# Patient Record
Sex: FEMALE | Race: WHITE | NOT HISPANIC OR LATINO | Employment: OTHER | URBAN - METROPOLITAN AREA
[De-identification: names, ages, dates, MRNs, and addresses within clinical notes are randomized per-mention and may not be internally consistent; named-entity substitution may affect disease eponyms.]

---

## 2017-01-04 ENCOUNTER — OFFICE VISIT (OUTPATIENT)
Dept: FAMILY MEDICINE CLINIC | Facility: CLINIC | Age: 47
End: 2017-01-04

## 2017-01-04 VITALS
BODY MASS INDEX: 27.81 KG/M2 | HEIGHT: 69 IN | DIASTOLIC BLOOD PRESSURE: 76 MMHG | OXYGEN SATURATION: 96 % | WEIGHT: 187.8 LBS | TEMPERATURE: 97.5 F | HEART RATE: 67 BPM | SYSTOLIC BLOOD PRESSURE: 116 MMHG

## 2017-01-04 DIAGNOSIS — G47.09 OTHER INSOMNIA: ICD-10-CM

## 2017-01-04 DIAGNOSIS — F41.9 ANXIETY: Primary | ICD-10-CM

## 2017-01-04 DIAGNOSIS — M54.50 LOW BACK PAIN WITHOUT SCIATICA, UNSPECIFIED BACK PAIN LATERALITY, UNSPECIFIED CHRONICITY: ICD-10-CM

## 2017-01-04 LAB
BILIRUB BLD-MCNC: NEGATIVE MG/DL
CLARITY, POC: CLEAR
COLOR UR: YELLOW
GLUCOSE UR STRIP-MCNC: NEGATIVE MG/DL
KETONES UR QL: NEGATIVE
LEUKOCYTE EST, POC: NEGATIVE
NITRITE UR-MCNC: NEGATIVE MG/ML
PH UR: 7 [PH] (ref 5–8)
PROT UR STRIP-MCNC: NEGATIVE MG/DL
RBC # UR STRIP: NEGATIVE /UL
SP GR UR: 1.02 (ref 1–1.03)
UROBILINOGEN UR QL: NORMAL

## 2017-01-04 PROCEDURE — 99214 OFFICE O/P EST MOD 30 MIN: CPT | Performed by: NURSE PRACTITIONER

## 2017-01-04 PROCEDURE — 81003 URINALYSIS AUTO W/O SCOPE: CPT | Performed by: NURSE PRACTITIONER

## 2017-01-04 RX ORDER — DIAZEPAM 10 MG/1
10 TABLET ORAL 2 TIMES DAILY
Qty: 60 TABLET | Refills: 0 | Status: SHIPPED | OUTPATIENT
Start: 2017-01-04

## 2017-01-04 RX ORDER — ZOLPIDEM TARTRATE 10 MG/1
10 TABLET ORAL NIGHTLY PRN
Qty: 90 TABLET | Refills: 0 | Status: SHIPPED | OUTPATIENT
Start: 2017-01-04 | End: 2017-04-03 | Stop reason: SDUPTHER

## 2017-01-04 RX ORDER — VITAMIN B COMPLEX
CAPSULE ORAL
COMMUNITY
End: 2021-12-03 | Stop reason: SDDI

## 2017-01-04 NOTE — PROGRESS NOTES
Subjective   Norma Thomas is a 46 y.o. female.     History of Present Illness   Norma Thomas 46 y.o. female presents for follow up of insomnia with onset of symptoms years ago. Patient describes symptoms as difficulty falling asleep. Patient has found complete relief with prescription sleep aid, Ambien. Associated symptoms include: fatigue if unable to take Rx . Patient denies daytime somnolence Symptoms have stabilized.  The patient has failed multiple OTC medications for insomnia.  They are well controlled on current Rx and will continue to try to take Rx PRN.  She will use the lowest effective dose.  The patient has read and signed the [facility] Controlled Substance Contract.  I will continue to see patient for regular follow up appointments and be prescribed the lowest effective dose.  REMEDIOS has been reviewed by me and is updated every 3 months. The patient is aware of the potential for addiction and dependence.  She denies that Ambien cause excessive daytime drowsiness and sleep walking.    Anxiety: Patient complains of anxiety disorder.  She has the following symptoms: chest pain, difficulty concentrating, racing thoughts, shortness of breath, sweating. Onset of symptoms was approximately several years ago, stable since that time. She denies current suicidal and homicidal ideation. Family history significant for no psychiatric illness.Possible organic causes contributing are: none. Risk factors: none Previous treatment includes Xanax   She complains of the following side effects from the treatment: none.    Upper Respiratory Infection: Patient complains of symptoms of a URI. Symptoms include left ear pain. Onset of symptoms was 3 days ago, unchanged since that time.   She is drinking plenty of fluids. Evaluation to date: none. Treatment to date: antihistamines.    Having some right side and back pain and wondering if has uti.  No other s/s    The following portions of the patient's history were  "reviewed and updated as appropriate: allergies, current medications, past social history and problem list.    Review of Systems   HENT: Positive for ear pain.    All other systems reviewed and are negative.      Objective   Visit Vitals   • /76 (BP Location: Left arm, Patient Position: Sitting)   • Pulse 67   • Temp 97.5 °F (36.4 °C)   • Ht 69\" (175.3 cm)   • Wt 187 lb 12.8 oz (85.2 kg)   • SpO2 96%   • BMI 27.73 kg/m2     Physical Exam   Constitutional: She is oriented to person, place, and time. Vital signs are normal. She appears well-developed and well-nourished. No distress.   HENT:   Head: Normocephalic.   Cardiovascular: Normal rate, regular rhythm and normal heart sounds.    Pulmonary/Chest: Effort normal and breath sounds normal.   Neurological: She is alert and oriented to person, place, and time. Gait normal.   Psychiatric: She has a normal mood and affect. Her behavior is normal. Judgment and thought content normal.   Vitals reviewed.    The patient has read and signed the [facility] Controlled Substance Contract.  I will continue to see patient for regular follow up appointments.  They are well controlled on their medication.  REMEDIOS has been reviewed by me and is updated every 3 months. The patient is aware of the potential for addiction and dependence.    Assessment/Plan   Problem List Items Addressed This Visit        Nervous and Auditory    Low back pain    Relevant Orders    POC Urinalysis Dipstick, Automated (Completed)       Other    Insomnia    Relevant Medications    zolpidem (AMBIEN) 10 MG tablet    Anxiety - Primary    Relevant Medications    diazePAM (VALIUM) 10 MG tablet           rto in 3 mons   "

## 2017-01-04 NOTE — MR AVS SNAPSHOT
Norma Thomas   1/4/2017 12:40 PM   Office Visit    Dept Phone:  [phone]   Encounter #:  [id number]    Provider:  SHANIA Laguna   Department:  [facility]                Your Full Care Plan              Today's Medication Changes          These changes are accurate as of: 1/4/17  1:54 PM.  If you have any questions, ask your nurse or doctor.               Stop taking medication(s)listed here:     azithromycin 250 MG tablet   Commonly known as:  ZITHROMAX Z-ISAAC   Stopped by:  SHANIA Laguna           DULERA IN   Stopped by:  SHANIA Laguna           guaiFENesin-codeine 100-10 MG/5ML syrup   Commonly known as:  ROMILAR-AC   Stopped by:  SHANIA Laguna           MethylPREDNISolone 4 MG tablet   Commonly known as:  MEDROL (ISAAC)   Stopped by:  SHANIA Laguna           PROAIR RESPICLICK 108 (90 BASE) MCG/ACT inhaler   Generic drug:  albuterol   Stopped by:  SHANIA Laguna           promethazine-dextromethorphan 6.25-15 MG/5ML syrup   Commonly known as:  PROMETHAZINE-DM   Stopped by:  SHANIA Laguna                Where to Get Your Medications      You can get these medications from any pharmacy     Bring a paper prescription for each of these medications     diazePAM 10 MG tablet    zolpidem 10 MG tablet                  Your Updated Medication List          This list is accurate as of: 1/4/17  1:54 PM.  Always use your most recent med list.                b complex vitamins capsule       diazePAM 10 MG tablet   Commonly known as:  VALIUM   Take 1 tablet by mouth 2 (Two) Times a Day.       escitalopram 20 MG tablet   Commonly known as:  LEXAPRO   TAKE ONE TABLET BY MOUTH DAILY       fexofenadine 180 MG tablet   Commonly known as:  ALLEGRA       Ibuprofen-Famotidine 800-26.6 MG tablet   Take 800 mg by mouth 3 (three) times a day. 1 - 2 x per day as needed       metoprolol succinate XL 50 MG 24 hr tablet   Commonly known as:   "TOPROL-XL   TAKE ONE TABLET BY MOUTH DAILY       zolpidem 10 MG tablet   Commonly known as:  AMBIEN   Take 1 tablet by mouth At Night As Needed for sleep.               We Performed the Following     POC Urinalysis Dipstick, Automated       You Were Diagnosed With        Codes Comments    Anxiety    -  Primary ICD-10-CM: F41.9  ICD-9-CM: 300.00     Other insomnia     ICD-10-CM: G47.09  ICD-9-CM: 780.52     Low back pain without sciatica, unspecified back pain laterality, unspecified chronicity     ICD-10-CM: M54.5  ICD-9-CM: 724.2       Medications to be Given to You by a Medical Professional       Instructions     None    Patient Instructions History      Upcoming Appointments     Visit Type Date Time Department    OFF[facility]2017 12[facility]    HOME SLEEP STUDY 1/26/2017  1:00 PM [facility]      MyChart Signup     Our records indicate that you have declined [facility] [employer] signup. If you would like to sign up for [employer], please email [email or web address] or call [phone] to obtain an activation code.             Other Info from Your Visit           Your Appointments     Jan 26, 2017  1:00 PM EST   HOME SLEEP STUDY with  [facility]   [facility] ([place])    [address] [place] [id number] [phone]              Allergies     Codeine      Dust Mite Extract      Latex      Penicillins      Trichophyton      Pseudoephedrine  Palpitations      Reason for Visit     Med Refill Needing Ambien refilled for insomnia and diazepam refilled. Also patient is wanting samples of duexis     Earache Patient is wanting her left ear checked due to some pain       Vital Signs     Blood Pressure Pulse Temperature Height Weight Oxygen Saturation    116/76 (BP Location: Left arm, Patient Position: Sitting) 67 97.5 °F (36.4 °C) 69\" (175.3 cm) 187 lb 12.8 oz (85.2 kg) 96%    Body Mass Index Smoking Status                27.73 " kg/m2 Never Smoker          Problems and Diagnoses Noted     Anxiety problem    Difficulty falling or staying asleep    Low back pain      Results     POC Urinalysis Dipstick, Automated      Component Value Standard Range & Units    Color Yellow Yellow, Straw, Dark Yellow, Renuka    Clarity, UA Clear Clear    Glucose, UA Negative Negative, 1000 mg/dL (3+) mg/dL    Bilirubin Negative Negative    Ketones, UA Negative Negative    Specific Gravity  1.020 1.005 - 1.030    Blood, UA Negative Negative    pH, Urine 7.0 5.0 - 8.0    Protein, POC Negative Negative mg/dL    Urobilinogen, UA Normal Normal    Leukocytes Negative Negative    Nitrite, UA Negative Negative

## 2017-01-12 ENCOUNTER — TELEPHONE (OUTPATIENT)
Dept: FAMILY MEDICINE CLINIC | Facility: CLINIC | Age: 47
End: 2017-01-12

## 2017-01-12 NOTE — TELEPHONE ENCOUNTER
Patient was told to call and let Vini know if she liked the samples of the Vimovo and she said it is working and was requesting a prescription

## 2017-01-26 ENCOUNTER — HOSPITAL ENCOUNTER (OUTPATIENT)
Dept: SLEEP MEDICINE | Facility: HOSPITAL | Age: 47
Discharge: HOME | End: 2017-01-26
Attending: PSYCHIATRY & NEUROLOGY | Admitting: PSYCHIATRY & NEUROLOGY

## 2017-01-26 DIAGNOSIS — G47.10 HYPERSOMNIA: ICD-10-CM

## 2017-01-26 DIAGNOSIS — G47.33 OSA (OBSTRUCTIVE SLEEP APNEA): ICD-10-CM

## 2017-01-26 DIAGNOSIS — I49.9 IRREGULAR HEART BEAT: ICD-10-CM

## 2017-01-26 PROCEDURE — 95806 SLEEP STUDY UNATT&RESP EFFT: CPT

## 2017-02-10 ENCOUNTER — TELEPHONE (OUTPATIENT)
Dept: FAMILY MEDICINE CLINIC | Facility: CLINIC | Age: 47
End: 2017-02-10

## 2017-02-10 RX ORDER — AZITHROMYCIN 250 MG/1
TABLET, FILM COATED ORAL
Qty: 6 TABLET | Refills: 0 | OUTPATIENT
Start: 2017-02-10 | End: 2020-07-17

## 2017-02-22 ENCOUNTER — TELEPHONE (OUTPATIENT)
Dept: FAMILY MEDICINE CLINIC | Facility: CLINIC | Age: 47
End: 2017-02-22

## 2017-02-22 ENCOUNTER — TELEPHONE (OUTPATIENT)
Dept: SLEEP MEDICINE | Facility: HOSPITAL | Age: 47
End: 2017-02-22

## 2017-02-22 NOTE — TELEPHONE ENCOUNTER
LM for pt to call sdc to s/u r/v with dr. Adler.  Hst results of 9.7 events per hour of sleep.    Pt to f/u for tx options

## 2017-02-23 NOTE — TELEPHONE ENCOUNTER
Yelena from the sleep center called and left a message saying [place] and is behind on his dictations when it is read they will send it to our office

## 2017-02-23 NOTE — TELEPHONE ENCOUNTER
So the test is in there but the results are not...1970 they not send over a result?  Can you find out?

## 2017-03-16 ENCOUNTER — TELEPHONE (OUTPATIENT)
Dept: SLEEP MEDICINE | Facility: HOSPITAL | Age: 47
End: 2017-03-16

## 2017-03-21 ENCOUNTER — HOSPITAL ENCOUNTER (OUTPATIENT)
Dept: SLEEP MEDICINE | Facility: HOSPITAL | Age: 47
Discharge: HOME | End: 2017-03-21

## 2017-03-22 NOTE — PROGRESS NOTES
DATE OF SERVICE: 03/21/2017    HISTORY OF PRESENT ILLNESS: The patient comes in for followup visit. Her home sleep study performed on 01/27/2017 revealed mild obstructive sleep apnea syndrome with apnea-hypopnea index AHI 9.7 per sleep hour, during supine position apnea-hypopnea index of 14.9, minimum SpO2 of 85%. She has severe hypersomnia with [place] Sleepiness Scale score of 17. Her snoring was noted for 26.5% of sleep time. Because of severe hypersomnia, mild obstructive sleep apnea, and now hypoxia, I plan to treat her with auto CPAP therapy.     PHYSICAL EXAMINATION:  VITAL SIGNS: Weight 205 pounds, height 69 inches, body mass index 38, blood pressure 108/60, heart rate 69.   HEENT: Normal.   NECK: Supple. No bruits.   CARDIAC: Normal.   LUNGS: Clear to auscultation.   EXTREMITIES: No edema.     IMPRESSION: Patient with obstructive sleep apnea syndrome with severe hypersomnia and irregular heartbeats, and will treat with auto CPAP therapy.     RECOMMENDATIONS: Auto CPAP 5-15 cm of water and follow up in 2 months. Exercise, diet, and weight loss discussed.        ARIADNA Costa:carlos  D:   03/21/2017 12:33:14  T:   03/22/2017 03:55:32  Job ID:   [id number]  Document ID:   [id number]  cc:

## 2017-03-24 ENCOUNTER — TELEPHONE (OUTPATIENT)
Dept: SLEEP MEDICINE | Facility: HOSPITAL | Age: 47
End: 2017-03-24

## 2017-04-03 DIAGNOSIS — G47.09 OTHER INSOMNIA: ICD-10-CM

## 2017-04-03 RX ORDER — ZOLPIDEM TARTRATE 10 MG/1
TABLET ORAL
Qty: 90 TABLET | Refills: 0 | Status: CANCELLED | OUTPATIENT
Start: 2017-04-03

## 2017-04-03 RX ORDER — ZOLPIDEM TARTRATE 10 MG/1
10 TABLET ORAL NIGHTLY PRN
Qty: 90 TABLET | Refills: 0 | OUTPATIENT
Start: 2017-04-03 | End: 2017-04-03 | Stop reason: SDUPTHER

## 2017-04-03 RX ORDER — ZOLPIDEM TARTRATE 10 MG/1
10 TABLET ORAL NIGHTLY PRN
Qty: 30 TABLET | Refills: 0 | OUTPATIENT
Start: 2017-04-03

## 2017-05-01 RX ORDER — ESCITALOPRAM OXALATE 20 MG/1
TABLET ORAL
Qty: 30 TABLET | Refills: 6 | Status: SHIPPED | OUTPATIENT
Start: 2017-05-01

## 2017-07-25 ENCOUNTER — APPOINTMENT (OUTPATIENT)
Dept: SLEEP MEDICINE | Facility: HOSPITAL | Age: 47
End: 2017-07-25

## 2017-08-08 ENCOUNTER — HOSPITAL ENCOUNTER (OUTPATIENT)
Dept: SLEEP MEDICINE | Facility: HOSPITAL | Age: 47
Discharge: HOME | End: 2017-08-08
Admitting: PSYCHIATRY & NEUROLOGY

## 2017-08-08 PROCEDURE — G0463 HOSPITAL OUTPT CLINIC VISIT: HCPCS
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
## 2017-09-07 ENCOUNTER — APPOINTMENT (OUTPATIENT)
Dept: WOMENS IMAGING | Facility: HOSPITAL | Age: 47
End: 2017-09-07

## 2017-09-07 PROCEDURE — 77063 BREAST TOMOSYNTHESIS BI: CPT | Performed by: RADIOLOGY

## 2017-09-07 PROCEDURE — 77067 SCR MAMMO BI INCL CAD: CPT | Performed by: RADIOLOGY

## 2017-09-07 PROCEDURE — G0202 SCR MAMMO BI INCL CAD: HCPCS | Performed by: RADIOLOGY

## 2017-10-05 ENCOUNTER — APPOINTMENT (OUTPATIENT)
Dept: WOMENS IMAGING | Facility: HOSPITAL | Age: 47
End: 2017-10-05

## 2017-10-05 PROCEDURE — 76641 ULTRASOUND BREAST COMPLETE: CPT | Performed by: RADIOLOGY

## 2017-10-05 PROCEDURE — G0279 TOMOSYNTHESIS, MAMMO: HCPCS | Performed by: RADIOLOGY

## 2017-10-05 PROCEDURE — 77061 BREAST TOMOSYNTHESIS UNI: CPT | Performed by: RADIOLOGY

## 2017-10-05 PROCEDURE — G0206 DX MAMMO INCL CAD UNI: HCPCS | Performed by: RADIOLOGY

## 2017-10-05 PROCEDURE — 77065 DX MAMMO INCL CAD UNI: CPT | Performed by: RADIOLOGY

## 2017-10-10 ENCOUNTER — APPOINTMENT (OUTPATIENT)
Dept: SLEEP MEDICINE | Facility: HOSPITAL | Age: 47
End: 2017-10-10

## 2017-10-13 ENCOUNTER — APPOINTMENT (OUTPATIENT)
Dept: WOMENS IMAGING | Facility: HOSPITAL | Age: 47
End: 2017-10-13

## 2017-10-13 PROCEDURE — 19084 BX BREAST ADD LESION US IMAG: CPT | Performed by: RADIOLOGY

## 2017-10-13 PROCEDURE — 19083 BX BREAST 1ST LESION US IMAG: CPT | Performed by: RADIOLOGY

## 2017-10-13 PROCEDURE — 19000 PUNCTURE ASPIR CYST BREAST: CPT | Performed by: RADIOLOGY

## 2017-10-13 PROCEDURE — 76942 ECHO GUIDE FOR BIOPSY: CPT | Performed by: RADIOLOGY

## 2018-04-25 ENCOUNTER — APPOINTMENT (OUTPATIENT)
Dept: WOMENS IMAGING | Facility: HOSPITAL | Age: 48
End: 2018-04-25

## 2018-04-25 PROCEDURE — 76641 ULTRASOUND BREAST COMPLETE: CPT | Performed by: RADIOLOGY

## 2018-05-08 ENCOUNTER — APPOINTMENT (OUTPATIENT)
Dept: WOMENS IMAGING | Facility: HOSPITAL | Age: 48
End: 2018-05-08

## 2018-05-08 PROCEDURE — 19083 BX BREAST 1ST LESION US IMAG: CPT | Performed by: RADIOLOGY

## 2019-09-09 ENCOUNTER — TRANSCRIBE ORDERS (OUTPATIENT)
Dept: ADMINISTRATIVE | Facility: HOSPITAL | Age: 49
End: 2019-09-09

## 2019-09-09 DIAGNOSIS — Z13.820 SCREENING FOR OSTEOPOROSIS: Primary | ICD-10-CM

## 2021-10-18 ENCOUNTER — APPOINTMENT (OUTPATIENT)
Dept: LAB | Facility: HOSPITAL | Age: 51
End: 2021-10-18

## 2021-10-28 ENCOUNTER — TELEPHONE (OUTPATIENT)
Dept: ONCOLOGY | Facility: CLINIC | Age: 51
End: 2021-10-28

## 2021-10-28 ENCOUNTER — APPOINTMENT (OUTPATIENT)
Dept: LAB | Facility: HOSPITAL | Age: 51
End: 2021-10-28

## 2021-11-01 ENCOUNTER — TELEPHONE (OUTPATIENT)
Dept: ONCOLOGY | Facility: OTHER | Age: 51
End: 2021-11-01

## 2021-11-01 ENCOUNTER — APPOINTMENT (OUTPATIENT)
Dept: LAB | Facility: HOSPITAL | Age: 51
End: 2021-11-01

## 2021-12-03 ENCOUNTER — CONSULT (OUTPATIENT)
Dept: ONCOLOGY | Facility: CLINIC | Age: 51
End: 2021-12-03

## 2021-12-03 ENCOUNTER — LAB (OUTPATIENT)
Dept: LAB | Facility: HOSPITAL | Age: 51
End: 2021-12-03

## 2021-12-03 VITALS
HEART RATE: 75 BPM | HEIGHT: 68 IN | OXYGEN SATURATION: 96 % | DIASTOLIC BLOOD PRESSURE: 77 MMHG | SYSTOLIC BLOOD PRESSURE: 114 MMHG | BODY MASS INDEX: 30.71 KG/M2 | RESPIRATION RATE: 16 BRPM | TEMPERATURE: 97.3 F | WEIGHT: 202.6 LBS

## 2021-12-03 DIAGNOSIS — R79.89 ABNORMAL CBC: Primary | ICD-10-CM

## 2021-12-03 DIAGNOSIS — R53.82 CHRONIC FATIGUE: ICD-10-CM

## 2021-12-03 DIAGNOSIS — M25.50 ARTHRALGIA OF MULTIPLE JOINTS: Primary | ICD-10-CM

## 2021-12-03 LAB
BASOPHILS # BLD AUTO: 0.04 10*3/MM3 (ref 0–0.2)
BASOPHILS NFR BLD AUTO: 0.5 % (ref 0–1.5)
DEPRECATED RDW RBC AUTO: 40.6 FL (ref 37–54)
EOSINOPHIL # BLD AUTO: 0.2 10*3/MM3 (ref 0–0.4)
EOSINOPHIL NFR BLD AUTO: 2.6 % (ref 0.3–6.2)
ERYTHROCYTE [DISTWIDTH] IN BLOOD BY AUTOMATED COUNT: 11.9 % (ref 12.3–15.4)
HCT VFR BLD AUTO: 41.6 % (ref 34–46.6)
HGB BLD-MCNC: 13.3 G/DL (ref 12–15.9)
IMM GRANULOCYTES # BLD AUTO: 0.03 10*3/MM3 (ref 0–0.05)
IMM GRANULOCYTES NFR BLD AUTO: 0.4 % (ref 0–0.5)
LYMPHOCYTES # BLD AUTO: 1.47 10*3/MM3 (ref 0.7–3.1)
LYMPHOCYTES NFR BLD AUTO: 19.4 % (ref 19.6–45.3)
MCH RBC QN AUTO: 29.6 PG (ref 26.6–33)
MCHC RBC AUTO-ENTMCNC: 32 G/DL (ref 31.5–35.7)
MCV RBC AUTO: 92.7 FL (ref 79–97)
MONOCYTES # BLD AUTO: 0.87 10*3/MM3 (ref 0.1–0.9)
MONOCYTES NFR BLD AUTO: 11.5 % (ref 5–12)
NEUTROPHILS NFR BLD AUTO: 4.97 10*3/MM3 (ref 1.7–7)
NEUTROPHILS NFR BLD AUTO: 65.6 % (ref 42.7–76)
NRBC BLD AUTO-RTO: 0 /100 WBC (ref 0–0.2)
PLATELET # BLD AUTO: 250 10*3/MM3 (ref 140–450)
PMV BLD AUTO: 10.2 FL (ref 6–12)
RBC # BLD AUTO: 4.49 10*6/MM3 (ref 3.77–5.28)
WBC NRBC COR # BLD: 7.58 10*3/MM3 (ref 3.4–10.8)

## 2021-12-03 PROCEDURE — 85025 COMPLETE CBC W/AUTO DIFF WBC: CPT

## 2021-12-03 PROCEDURE — 36415 COLL VENOUS BLD VENIPUNCTURE: CPT

## 2021-12-03 PROCEDURE — 99204 OFFICE O/P NEW MOD 45 MIN: CPT | Performed by: INTERNAL MEDICINE

## 2021-12-03 RX ORDER — DIPHENOXYLATE HYDROCHLORIDE AND ATROPINE SULFATE 2.5; .025 MG/1; MG/1
TABLET ORAL DAILY
COMMUNITY

## 2021-12-03 RX ORDER — TRAMADOL HYDROCHLORIDE 50 MG/1
TABLET ORAL
COMMUNITY
Start: 2021-12-01

## 2021-12-04 LAB
ALBUMIN SERPL-MCNC: 4.5 G/DL (ref 3.8–4.9)
ALBUMIN/GLOB SERPL: 2.1 {RATIO} (ref 1.2–2.2)
ALP SERPL-CCNC: 92 IU/L (ref 44–121)
ALT SERPL-CCNC: 15 IU/L (ref 0–32)
AST SERPL-CCNC: 19 IU/L (ref 0–40)
BILIRUB SERPL-MCNC: 0.3 MG/DL (ref 0–1.2)
BUN SERPL-MCNC: 17 MG/DL (ref 6–24)
BUN/CREAT SERPL: 22 (ref 9–23)
CALCIUM SERPL-MCNC: 9.8 MG/DL (ref 8.7–10.2)
CHLORIDE SERPL-SCNC: 105 MMOL/L (ref 96–106)
CO2 SERPL-SCNC: 22 MMOL/L (ref 20–29)
CREAT SERPL-MCNC: 0.79 MG/DL (ref 0.57–1)
CRP SERPL-MCNC: 6 MG/L (ref 0–10)
ERYTHROCYTE [SEDIMENTATION RATE] IN BLOOD BY WESTERGREN METHOD: 3 MM/HR (ref 0–40)
GLOBULIN SER CALC-MCNC: 2.1 G/DL (ref 1.5–4.5)
GLUCOSE SERPL-MCNC: 99 MG/DL (ref 65–99)
POTASSIUM SERPL-SCNC: 4.6 MMOL/L (ref 3.5–5.2)
PROT SERPL-MCNC: 6.6 G/DL (ref 6–8.5)
RHEUMATOID FACT SERPL-ACNC: <10 IU/ML
SODIUM SERPL-SCNC: 146 MMOL/L (ref 134–144)

## 2021-12-04 NOTE — PROGRESS NOTES
Chief Complaint  Abnormal von Willebrand's labs    Subjective    The patient was referred by Dr. Pratik Myles for hematology consultation regarding the above issue.    History of Present Illness  The patient is a 51-year-old female here for hematology consultation regarding abnormal von Willebrand's labs.    The patient has prior history of hypertension, GERD, depression/anxiety, chronic fatigue, fibromyalgia, obstructive sleep apnea, previous menorrhagia (status post endometrial ablation with subsequent menopause), endometriosis, mitral valve prolapse, chronic back pain, previous benign breast biopsies.    The patient denies any family history of bleeding disorder. In fact there is history of thrombosis with her mother having had DVT and pulmonary embolism around age 70 in the setting of lupus and is on chronic anticoagulation. She believes that her father has had a DVT at age 87, maternal aunt with DVT, maternal grandmother with a CVA.    The patient has never experienced any significant bleeding difficulties with the exception of menorrhagia. She underwent endometrial ablation around age 40 with normalization of menstrual cycles until she became menopausal around age 50. She has experienced previous tooth extractions without significant bleeding difficulties.    The patient reports that she has had significant difficulty with chronic fatigue for quite some time. She also has chronic arthralgias. She takes diclofenac 75 mg twice per day and has done this for over a year. She developed increased bruising involving her forearms in early 2021. She had laboratory evaluation 2/26/2021 with Dr. Myles with factor VIII activity 177%, von Willebrand factor antigen 223%, von Willebrand factor activity 173%, INR 1.0, PTT 28, platelet count 314,000.    Patient reports that she has had a positive DOMENICO in the past. She was referred to both hematology due to her abnormal von Willebrand laboratory values and was also referred to  "rheumatology however rheumatology consultation has been placed on hold pending hematology evaluation.      Objective   Vital Signs:   /77   Pulse 75   Temp 97.3 °F (36.3 °C) (Temporal)   Resp 16   Ht 173 cm (68.11\") Comment: new ht  Wt 91.9 kg (202 lb 9.6 oz)   SpO2 96%   BMI 30.71 kg/m²     Physical Exam  Constitutional:       Appearance: She is well-developed.   Eyes:      Conjunctiva/sclera: Conjunctivae normal.   Neck:      Thyroid: No thyromegaly.   Cardiovascular:      Rate and Rhythm: Normal rate and regular rhythm.      Heart sounds: No murmur heard.  No friction rub. No gallop.    Pulmonary:      Effort: No respiratory distress.      Breath sounds: Normal breath sounds.   Chest:   Breasts:      Right: No supraclavicular adenopathy.      Left: No supraclavicular adenopathy.       Abdominal:      General: Bowel sounds are normal. There is no distension.      Palpations: Abdomen is soft.      Tenderness: There is no abdominal tenderness.   Lymphadenopathy:      Head:      Right side of head: No submandibular adenopathy.      Cervical: No cervical adenopathy.      Upper Body:      Right upper body: No supraclavicular adenopathy.      Left upper body: No supraclavicular adenopathy.   Skin:     General: Skin is warm and dry.      Findings: No rash.   Neurological:      Mental Status: She is alert and oriented to person, place, and time.      Cranial Nerves: No cranial nerve deficit.      Motor: No abnormal muscle tone.      Deep Tendon Reflexes: Reflexes normal.   Psychiatric:         Behavior: Behavior normal.        Result Review : Reviewed CBC from today. Reviewed laboratory studies from PCP including factor VIII activity, von Willebrand factor antigen and activity, PT, PTT, CBC. Reviewed PCP progress note.       Assessment and Plan     1. Easy bruising  · The patient denies any family history of bleeding disorder. In fact there is history of thrombosis with her mother having had DVT and pulmonary " embolism around age 70 in the setting of lupus and is on chronic anticoagulation. She believes that her father had a DVT at age 87, maternal aunt with DVT, maternal grandmother with a CVA.  · The patient has never experienced any significant bleeding difficulties with the exception of menorrhagia. She underwent endometrial ablation around age 40 with normalization of menstrual cycles until she became menopausal around age 50. She has experienced previous tooth extractions without significant bleeding difficulties.  · The patient reports that she has had significant difficulty with chronic fatigue for quite some time. She also has chronic arthralgias. She takes diclofenac 75 mg twice per day and has done this for over a year.   · She developed increased bruising involving her forearms in early 2021.   · She had laboratory evaluation 2/26/2021 with Dr. Myles with factor VIII activity 177%, von Willebrand factor antigen 223%, von Willebrand factor activity 173%, INR 1.0, PTT 28, platelet count 314,000.  · Patient is here today for initial hematology evaluation. She has had some minor difficulty with easy bruising on her forearms. She has not experienced any more significant bleeding issues. She does take diclofenac scheduled 75 mg twice daily. This appears to be the likely cause of her easy bruising. She reports that the bruising has improved recently. We reviewed her previous labs from 2/26/2021. We discussed that concern regarding von Willebrand's disease would be based on low von Willebrand factor activity/antigen and not based on elevated levels as she exhibited. There is nothing to suggest von Willebrand's disease in this situation. I would not recommend any further evaluation for this issue.  2. Chronic fatigue and arthralgias in the setting of fibromyalgia  · Most likely the patient's symptoms are related to her underlying fibromyalgia  · I did recommend that she follow through with rheumatology  consultation  · We will check additional labs today with ESR, CRP, DOMENICO panel, rheumatoid factor, anti-CCP antibodies and we will notify her of any abnormal results.    Plan:  1. There are no laboratory or clinical findings to suggest von Willebrand's disease, no further evaluation is recommended  2. Additional labs with ESR, CRP, DOMENICO panel, rheumatoid factor, anti-CCP antibodies today. Will notify patient of any abnormal results. Patient was encouraged to follow through with rheumatology consultation.  3. Patient does not require any further hematologic follow-up at this time.        I spent 45 minutes caring for Norma on this date of service. This time includes time spent by me in the following activities:preparing for the visit, reviewing tests, performing a medically appropriate examination and/or evaluation , counseling and educating the patient/family/caregiver, ordering medications, tests, or procedures and documenting information in the medical record

## 2021-12-05 LAB
CENTROMERE B AB SER-ACNC: <0.2 AI (ref 0–0.9)
CHROMATIN AB SERPL-ACNC: <0.2 AI (ref 0–0.9)
DSDNA AB SER-ACNC: <1 IU/ML (ref 0–9)
ENA JO1 AB SER-ACNC: <0.2 AI (ref 0–0.9)
ENA RNP AB SER-ACNC: <0.2 AI (ref 0–0.9)
ENA SCL70 AB SER-ACNC: <0.2 AI (ref 0–0.9)
ENA SM AB SER-ACNC: <0.2 AI (ref 0–0.9)
ENA SS-A AB SER-ACNC: <0.2 AI (ref 0–0.9)
ENA SS-B AB SER-ACNC: <0.2 AI (ref 0–0.9)
Lab: NORMAL

## 2021-12-06 LAB — CCP IGA+IGG SERPL IA-ACNC: 7 UNITS (ref 0–19)

## 2021-12-07 ENCOUNTER — TELEPHONE (OUTPATIENT)
Dept: ONCOLOGY | Facility: CLINIC | Age: 51
End: 2021-12-07

## 2021-12-07 NOTE — TELEPHONE ENCOUNTER
Phoned patient to let her know there were no significant abnormalities found in her lab work from her recent office visit. Patient v/u and requested a copy of her labs to be mailed to her. Confirmed address with patient.

## 2024-08-29 ENCOUNTER — TELEPHONE (OUTPATIENT)
Age: 54
End: 2024-08-29
Payer: COMMERCIAL

## 2024-09-09 ENCOUNTER — OFFICE VISIT (OUTPATIENT)
Age: 54
End: 2024-09-09
Payer: COMMERCIAL

## 2024-09-09 VITALS
BODY MASS INDEX: 30.62 KG/M2 | DIASTOLIC BLOOD PRESSURE: 70 MMHG | WEIGHT: 202 LBS | SYSTOLIC BLOOD PRESSURE: 122 MMHG | RESPIRATION RATE: 18 BRPM | OXYGEN SATURATION: 100 % | HEIGHT: 68 IN | HEART RATE: 74 BPM

## 2024-09-09 DIAGNOSIS — M25.50 ARTHRALGIA OF MULTIPLE JOINTS: ICD-10-CM

## 2024-09-09 DIAGNOSIS — M79.7 FIBROMYALGIA: Primary | ICD-10-CM

## 2024-09-09 DIAGNOSIS — F33.9 EPISODE OF RECURRENT MAJOR DEPRESSIVE DISORDER, UNSPECIFIED DEPRESSION EPISODE SEVERITY: ICD-10-CM

## 2024-09-09 PROBLEM — U07.1 COVID-19: Status: RESOLVED | Noted: 2022-01-10 | Resolved: 2024-09-09

## 2024-09-09 PROBLEM — J45.909 ASTHMA: Status: ACTIVE | Noted: 2020-03-24

## 2024-09-09 PROBLEM — I10 ESSENTIAL HYPERTENSION: Status: ACTIVE | Noted: 2019-09-06

## 2024-09-09 PROBLEM — Z86.010 HX OF COLONIC POLYPS: Status: ACTIVE | Noted: 2023-04-20

## 2024-09-09 PROBLEM — F33.1 MODERATE RECURRENT MAJOR DEPRESSION: Status: ACTIVE | Noted: 2022-03-01

## 2024-09-09 PROBLEM — M41.9 SCOLIOSIS OF THORACIC SPINE: Status: ACTIVE | Noted: 2019-09-06

## 2024-09-09 PROBLEM — Z86.0100 HX OF COLONIC POLYPS: Status: ACTIVE | Noted: 2023-04-20

## 2024-09-09 PROBLEM — M51.379 DEGENERATION OF LUMBOSACRAL INTERVERTEBRAL DISC: Status: ACTIVE | Noted: 2020-08-31

## 2024-09-09 PROBLEM — I49.3 VENTRICULAR PREMATURE BEATS: Status: ACTIVE | Noted: 2019-09-06

## 2024-09-09 PROBLEM — M51.37 DEGENERATION OF LUMBOSACRAL INTERVERTEBRAL DISC: Status: ACTIVE | Noted: 2020-08-31

## 2024-09-09 PROBLEM — R07.9 CHEST PAIN: Status: RESOLVED | Noted: 2022-02-24 | Resolved: 2024-09-09

## 2024-09-09 PROBLEM — Z84.89 FAMILY HISTORY OF NEOPLASM OF BREAST: Status: ACTIVE | Noted: 2019-10-11

## 2024-09-09 PROBLEM — E87.6 HYPOKALEMIA: Status: ACTIVE | Noted: 2022-02-24

## 2024-09-09 PROCEDURE — 99213 OFFICE O/P EST LOW 20 MIN: CPT

## 2024-09-09 NOTE — PROGRESS NOTES
"Chief Complaint  Spasms (Pt requesting a steriod shot)    Subjective        Norma Thomas presents to [facility]  Pain  This is a chronic problem. The current episode started more than 1 year ago. The problem occurs constantly. The problem has been gradually worsening. Associated symptoms include arthralgias. The symptoms are aggravated by standing, twisting, stress, walking, exertion and bending. She has tried immobilization, lying down, NSAIDs, position changes, relaxation, rest, sleep, walking, eating, heat, ice and acetaminophen for the symptoms. The treatment provided mild relief.       Objective   Vital Signs:  /70 (BP Location: Right arm, Patient Position: Sitting, Cuff Size: Adult)   Pulse 74   Resp 18   Ht 172.7 cm (68\")   Wt 91.6 kg (202 lb)   SpO2 100%   BMI 30.71 kg/m²   Estimated body mass index is 30.71 kg/m² as calculated from the following:    Height as of this encounter: 172.7 cm (68\").    Weight as of this encounter: 91.6 kg (202 lb).          Review of Systems   Musculoskeletal:  Positive for arthralgias, back pain and gait problem.      Physical Exam  Constitutional:       General: She is not in acute distress.     Appearance: Normal appearance. She is normal weight. She is not toxic-appearing.   HENT:      Head: Normocephalic and atraumatic.      Right Ear: Tympanic membrane, ear canal and external ear normal. There is no impacted cerumen.      Left Ear: Tympanic membrane, ear canal and external ear normal. There is no impacted cerumen.      Nose: Nose normal. No congestion or rhinorrhea.      Mouth/Throat:      Mouth: Mucous membranes are moist.      Pharynx: Oropharynx is clear. No oropharyngeal exudate or posterior oropharyngeal erythema.   Eyes:      General: No scleral icterus.     Extraocular Movements: Extraocular movements intact.      Conjunctiva/sclera: Conjunctivae normal.      Pupils: Pupils are equal, round, and reactive to light.   Neck: "      Vascular: No carotid bruit.   Cardiovascular:      Rate and Rhythm: Normal rate and regular rhythm.      Pulses: Normal pulses.      Heart sounds: Normal heart sounds. No murmur heard.     No friction rub. No gallop.   Pulmonary:      Effort: Pulmonary effort is normal. No respiratory distress.      Breath sounds: Normal breath sounds. No stridor. No wheezing, rhonchi or rales.   Chest:      Chest wall: No tenderness.   Abdominal:      General: Abdomen is flat. There is no distension.      Palpations: Abdomen is soft. There is no mass.      Tenderness: There is no abdominal tenderness. There is no right CVA tenderness, left CVA tenderness, guarding or rebound.      Hernia: No hernia is present.   Musculoskeletal:         General: Normal range of motion.      Cervical back: No rigidity or tenderness.      Right lower leg: No edema.      Left lower leg: No edema.      Comments: Physical exam is consisting with  fibromyalgia point tenderness.    Lymphadenopathy:      Cervical: No cervical adenopathy.   Skin:     General: Skin is warm and dry.      Capillary Refill: Capillary refill takes less than 2 seconds.      Coloration: Skin is not jaundiced or pale.      Findings: No bruising, erythema or lesion.   Neurological:      General: No focal deficit present.      Mental Status: She is alert and oriented to person, place, and time. Mental status is at baseline.   Psychiatric:         Mood and Affect: Mood normal.         Behavior: Behavior normal.         Thought Content: Thought content normal.         Judgment: Judgment normal.        Result Review :                 Assessment and Plan   Diagnoses and all orders for this visit:    1. Fibromyalgia (Primary)    2. Arthralgia of multiple joints  -     Ambulatory Referral to Rheumatology    3. Episode of recurrent major depressive disorder, unspecified depression episode severity             Follow Up   No follow-ups on file.  Patient was given instructions and  counseling regarding her condition or for health maintenance advice. Please see specific information pulled into the AVS if appropriate.     Patient is flared today with multiple points of tenderness, generalized fatigue and pain. Recommend therapy gave patient number for [email or web address] she is going to call and setup apt.

## 2024-10-10 DIAGNOSIS — F41.9 ANXIETY: ICD-10-CM

## 2024-10-13 RX ORDER — DIAZEPAM 10 MG
10 TABLET ORAL 2 TIMES DAILY
Qty: 20 TABLET | Refills: 0 | Status: SHIPPED | OUTPATIENT
Start: 2024-10-13

## 2024-10-13 RX ORDER — ESCITALOPRAM OXALATE 20 MG/1
20 TABLET ORAL DAILY
Qty: 90 TABLET | Refills: 1 | Status: SHIPPED | OUTPATIENT
Start: 2024-10-13

## 2024-10-19 DIAGNOSIS — G47.09 OTHER INSOMNIA: ICD-10-CM

## 2024-10-21 RX ORDER — ZOLPIDEM TARTRATE 10 MG/1
10 TABLET ORAL
Qty: 30 TABLET | Refills: 5 | Status: SHIPPED | OUTPATIENT
Start: 2024-10-21

## 2024-11-11 ENCOUNTER — TELEPHONE (OUTPATIENT)
Age: 54
End: 2024-11-11
Payer: COMMERCIAL

## 2024-11-11 NOTE — TELEPHONE ENCOUNTER
Pt called office to schedule an appt due to a possible kidney/liver inf. Pt wanted to make sure orders were in there.

## 2024-11-14 RX ORDER — METOPROLOL SUCCINATE 50 MG/1
50 TABLET, EXTENDED RELEASE ORAL DAILY
Qty: 30 TABLET | Refills: 9 | Status: SHIPPED | OUTPATIENT
Start: 2024-11-14

## 2024-11-20 ENCOUNTER — TELEPHONE (OUTPATIENT)
Age: 54
End: 2024-11-20
Payer: COMMERCIAL

## 2024-11-20 DIAGNOSIS — F41.9 ANXIETY: ICD-10-CM

## 2024-11-20 NOTE — TELEPHONE ENCOUNTER
Called Ms. William back, she wanted to let Yohan know she started her therapy sessions, and she will follow up in 3 month for her medication refills.

## 2024-11-21 RX ORDER — DIAZEPAM 10 MG/1
10 TABLET ORAL 2 TIMES DAILY
Qty: 20 TABLET | OUTPATIENT
Start: 2024-11-21

## 2024-12-09 ENCOUNTER — TELEPHONE (OUTPATIENT)
Age: 54
End: 2024-12-09
Payer: COMMERCIAL

## 2024-12-09 NOTE — TELEPHONE ENCOUNTER
Pt called and wanted to let Yohan know she is going to try and still come to her appt tomorrow 12/10/2024. Pt states that it feels like when she had Covid 2 years ago and that there is crackling in her chest. Pt states she's going to try and go to the ER but if not she will be at her appt tomorrow.

## 2024-12-16 ENCOUNTER — TELEPHONE (OUTPATIENT)
Age: 54
End: 2024-12-16
Payer: COMMERCIAL

## 2024-12-16 NOTE — TELEPHONE ENCOUNTER
Pt called office to cancel appt that was today at 11:30 to follow up on her recent visit to the ER due to RSV. Pt states that she still isnt feeling well and just woke up and isnt able to make it in. I offered telehealth and did inform pt would need to set up MyChart and I could walk her through. Pt declined and stated she cannot get her email to work. I told pt I would message provider to see what was recommended. Please advise pt.

## 2025-01-02 ENCOUNTER — OFFICE VISIT (OUTPATIENT)
Age: 55
End: 2025-01-02
Payer: COMMERCIAL

## 2025-01-02 VITALS
HEART RATE: 94 BPM | HEIGHT: 68 IN | TEMPERATURE: 97.8 F | WEIGHT: 202 LBS | OXYGEN SATURATION: 99 % | SYSTOLIC BLOOD PRESSURE: 124 MMHG | RESPIRATION RATE: 17 BRPM | DIASTOLIC BLOOD PRESSURE: 80 MMHG | BODY MASS INDEX: 30.62 KG/M2

## 2025-01-02 DIAGNOSIS — R30.0 DYSURIA: ICD-10-CM

## 2025-01-02 DIAGNOSIS — T78.40XS ALLERGY, SEQUELA: ICD-10-CM

## 2025-01-02 DIAGNOSIS — Z79.899 LONG-TERM USE OF HIGH-RISK MEDICATION: ICD-10-CM

## 2025-01-02 DIAGNOSIS — F41.9 ANXIETY: Primary | ICD-10-CM

## 2025-01-02 DIAGNOSIS — F33.9 EPISODE OF RECURRENT MAJOR DEPRESSIVE DISORDER, UNSPECIFIED DEPRESSION EPISODE SEVERITY: ICD-10-CM

## 2025-01-02 LAB
AMPHET+METHAMPHET UR QL: NEGATIVE
AMPHETAMINE INTERNAL CONTROL: ABNORMAL
AMPHETAMINES UR QL: NEGATIVE
BARBITURATE INTERNAL CONTROL: ABNORMAL
BARBITURATES UR QL SCN: NEGATIVE
BENZODIAZ UR QL SCN: POSITIVE
BENZODIAZEPINE INTERNAL CONTROL: ABNORMAL
BILIRUB BLD-MCNC: NEGATIVE MG/DL
BUPRENORPHINE INTERNAL CONTROL: ABNORMAL
BUPRENORPHINE SERPL-MCNC: NEGATIVE NG/ML
CANNABINOIDS SERPL QL: POSITIVE
CLARITY, POC: CLEAR
COCAINE INTERNAL CONTROL: ABNORMAL
COCAINE UR QL: NEGATIVE
COLOR UR: YELLOW
EXPIRATION DATE: ABNORMAL
EXPIRATION DATE: NORMAL
GLUCOSE UR STRIP-MCNC: NEGATIVE MG/DL
KETONES UR QL: NEGATIVE
LEUKOCYTE EST, POC: NEGATIVE
Lab: ABNORMAL
Lab: NORMAL
MDMA (ECSTASY) INTERNAL CONTROL: ABNORMAL
MDMA UR QL SCN: NEGATIVE
METHADONE INTERNAL CONTROL: ABNORMAL
METHADONE UR QL SCN: NEGATIVE
METHAMPHETAMINE INTERNAL CONTROL: ABNORMAL
MORPHINE INTERNAL CONTROL: ABNORMAL
MORPHINE/OPIATES SCREEN, URINE: NEGATIVE
NITRITE UR-MCNC: NEGATIVE MG/ML
OXYCODONE INTERNAL CONTROL: ABNORMAL
OXYCODONE UR QL SCN: NEGATIVE
PCP UR QL SCN: NEGATIVE
PH UR: 6 [PH] (ref 5–8)
PHENCYCLIDINE INTERNAL CONTROL: ABNORMAL
PROT UR STRIP-MCNC: NEGATIVE MG/DL
RBC # UR STRIP: NEGATIVE /UL
SP GR UR: 1.01 (ref 1–1.03)
THC INTERNAL CONTROL: ABNORMAL
UROBILINOGEN UR QL: NORMAL

## 2025-01-02 RX ORDER — CETIRIZINE HYDROCHLORIDE 10 MG/1
10 TABLET ORAL DAILY
Qty: 30 TABLET | Refills: 0 | Status: SHIPPED | OUTPATIENT
Start: 2025-01-02

## 2025-01-02 RX ORDER — METOPROLOL SUCCINATE 50 MG/1
1 TABLET, EXTENDED RELEASE ORAL DAILY
COMMUNITY

## 2025-01-02 RX ORDER — ONDANSETRON 4 MG/1
4 TABLET, ORALLY DISINTEGRATING ORAL EVERY 8 HOURS PRN
Qty: 30 TABLET | Refills: 0 | Status: SHIPPED | OUTPATIENT
Start: 2025-01-02

## 2025-01-02 RX ORDER — METAXALONE 800 MG/1
800 TABLET ORAL 3 TIMES DAILY
COMMUNITY
End: 2025-01-02

## 2025-01-02 RX ORDER — ESCITALOPRAM OXALATE 20 MG/1
1 TABLET ORAL DAILY
COMMUNITY
End: 2025-01-02

## 2025-01-02 RX ORDER — METAXALONE 800 MG/1
800 TABLET ORAL 3 TIMES DAILY PRN
Qty: 90 TABLET | Refills: 0 | Status: SHIPPED | OUTPATIENT
Start: 2025-01-02

## 2025-01-02 RX ORDER — DIAZEPAM 10 MG/1
10 TABLET ORAL EVERY 12 HOURS PRN
Qty: 30 TABLET | Refills: 0 | Status: CANCELLED | OUTPATIENT
Start: 2025-01-02

## 2025-01-02 NOTE — PROGRESS NOTES
Chief Complaint  Laceration (Pt cut her foot in the bathroom Left foot)    Subjective        Norma Thomas presents to [facility]  History of Present Illness    History of Present Illness  The patient presents for evaluation of depression, sinus infection, and foot laceration.    She reports a decline in her mental health, particularly during the holiday season, and is seeking additional medication to manage her depressive symptoms. She has been experiencing anxiety and depression, which have worsened over the past 2 years. She has been unable to work due to her physical and mental health issues. She has been experiencing significant post-traumatic stress syndrome and anxiety related to her marriage. She has attempted individual and marriage counseling without success. She has been confined to her home for the past 2 weeks due to her mental health struggles. She has been using THC intermittently for pain management but has not used Valium for approximately 1 month. She has been on diazepam for an extended period and has not had a refill recently. She has been using Skelaxin for muscle relaxation. She is currently on Lexapro and is undergoing telehealth therapy with Lavonne at [place].    She has been experiencing physical discomfort, including a sinus infection, fluid accumulation behind her ears, and coughing up brown phlegm. She is uncertain if these symptoms are residual effects of her recent recovery from RSV or indicative of a new issue. She has been off Allegra and Zyrtec for some time. She has been using Zofran as needed for nausea, which she believes is triggered by low blood pressure. She has been on home oxygen therapy at night for the past 2 years due to COVID-19, which caused her oxygen saturation levels to drop into the 70s during sleep. She has been using a nebulizer for breathing treatments.    She sustained a foot laceration a few nights ago, which she managed to stitch  "herself. She has been changing the dressing and cleaning the wound daily, noting an improvement in its appearance. She has been using a laceration kit that includes glue and stitches.    She has been experiencing dysuria and suspects a urinary tract infection. She has a history of kidney stones and believes she passed a stone the week prior to her hospitalization for respiratory issues. She is concerned about potential bladder or kidney issues.    Supplemental Information  She had a colonoscopy last summer, during which a couple of small lesions were biopsied and found to be benign. She has a history of endometriosis, Kenan-Barr virus, and herpes, which she believes are interconnected. She has been experiencing episodes of illness, which she attributes to her endometriosis.    SOCIAL HISTORY  She does not drink alcohol anymore, except for an occasional cocktail in the summer.    MEDICATIONS  Current: Lexapro, Zofran, Skelaxin, Allegra       Objective   Vital Signs:  /80 (BP Location: Left arm, Patient Position: Sitting, Cuff Size: Adult)   Pulse 94   Temp 97.8 °F (36.6 °C) (Oral)   Resp 17   Ht 172.7 cm (67.99\")   Wt 91.6 kg (202 lb)   SpO2 99%   BMI 30.72 kg/m²   Estimated body mass index is 30.72 kg/m² as calculated from the following:    Height as of this encounter: 172.7 cm (67.99\").    Weight as of this encounter: 91.6 kg (202 lb).            Review of Systems   Physical Exam   Result Review :          Results                Assessment and Plan   Diagnoses and all orders for this visit:    1. Anxiety (Primary)  -     metaxalone (SKELAXIN) 800 MG tablet; Take 1 tablet by mouth 3 (Three) Times a Day As Needed for Muscle Spasms.  Dispense: 90 tablet; Refill: 0  -     ondansetron ODT (ZOFRAN-ODT) 4 MG disintegrating tablet; Place 1 tablet on the tongue Every 8 (Eight) Hours As Needed for Nausea or Vomiting.  Dispense: 30 tablet; Refill: 0  -     Urine Drug Screen - Urine, Clean Catch    2. Dysuria  - "     POCT urinalysis dipstick, automated  -     POC 12 Panel Urine Drug Screen    3. Episode of recurrent major depressive disorder, unspecified depression episode severity    4. Long-term use of high-risk medication  -     POCT urinalysis dipstick, automated  -     Urine Drug Screen - Urine, Clean Catch    5. Allergy, sequela  -     cetirizine (zyrTEC) 10 MG tablet; Take 1 tablet by mouth Daily.  Dispense: 30 tablet; Refill: 0        Assessment & Plan  1. Depression.  She has been experiencing severe depression, especially around the holiday season. She is currently taking Lexapro. She has been advised to consult with Matilda regarding an appointment for TMS evaluation. She has been informed about the potential cognitive fogging effects of antihistamines and cannabinoids, and the risks associated with THC use, including exacerbation of depression and increased risk of cardiovascular disease. A urine drug screen will be conducted today, and a confirmatory test will be sent to the lab. Prescriptions for Zofran and Skelaxin have been provided.    2. Sinus Infection.  She reports symptoms of a sinus infection, including fluid behind her ears and coughing up brown mucus. She has been off Allegra. She has been advised to resume Allegra to help dry out the congestion. If symptoms persist, further evaluation may be necessary.    3. Foot Laceration.  She cut her foot open a couple of nights ago and did not seek professional medical care for stitches. She has been managing the wound herself with a laceration kit. The wound appears to be healing well, but she has been advised to monitor for signs of infection.    4. Anxiety.  She has been experiencing significant anxiety, exacerbated by personal circumstances and the holiday season. She has been advised to continue her current therapy sessions and consider TMS as a potential treatment option.    5. Post-Traumatic Stress Disorder (PTSD).  She reports experiencing PTSD symptoms  related to past trauma. She has been advised to continue her current therapy sessions and consider TMS as a potential treatment option.    6. Medication Management.  She has been out of Skelaxin and has not taken it for a day. A prescription for Skelaxin has been provided. She has been advised to avoid combining Valium and THC due to potential interactions Valium will be discontinued at this time.    PROCEDURE  The patient underwent a colonoscopy last summer, during which a couple of small lesions were biopsied and found to be benign.         I spent 70 minutes caring for Norma on this date of service. This time includes time spent by me in the following activities:preparing for the visit, reviewing tests, performing a medically appropriate examination and/or evaluation , counseling and educating the patient/family/caregiver, ordering medications, tests, or procedures, documenting information in the medical record, independently interpreting results and communicating that information with the patient/family/caregiver, and care coordination     Follow Up   No follow-ups on file.  Patient was given instructions and counseling regarding her condition or for health maintenance advice. Please see specific information pulled into the AVS if appropriate.         Patient or patient representative verbalized consent for the use of Ambient Listening during the visit with  SHANIA Kelly for chart documentation. 1/3/2025  16:59 EST

## 2025-01-06 LAB
AMPHETAMINES UR QL SCN: NEGATIVE NG/ML
BARBITURATES UR QL SCN: NEGATIVE NG/ML
BENZODIAZ UR QL SCN: NEGATIVE NG/ML
BZE UR QL SCN: NEGATIVE NG/ML
CANNABINOIDS UR QL SCN: NEGATIVE NG/ML
CREAT UR-MCNC: 98 MG/DL (ref 20–300)
LABORATORY COMMENT REPORT: NORMAL
METHADONE UR QL SCN: NEGATIVE NG/ML
OPIATES UR QL SCN: NEGATIVE NG/ML
OXYCODONE+OXYMORPHONE UR QL SCN: NEGATIVE NG/ML
PCP UR QL: NEGATIVE NG/ML
PH UR: 5.4 [PH] (ref 4.5–8.9)
PROPOXYPH UR QL SCN: NEGATIVE NG/ML

## 2025-01-17 ENCOUNTER — TELEPHONE (OUTPATIENT)
Age: 55
End: 2025-01-17
Payer: COMMERCIAL

## 2025-01-17 NOTE — TELEPHONE ENCOUNTER
Patient called and was curious why she was not getting a refill on her Anxiety meds.    She was informed about her Positive Drug screen and that she would have to choose between the medication or what she is currtently taking per provider.     Patient states she will stop what she is doing to get her refill on anxiety meds as she has been on it PRN for 30 years.     Please advise.

## 2025-01-20 ENCOUNTER — TELEPHONE (OUTPATIENT)
Age: 55
End: 2025-01-20
Payer: COMMERCIAL

## 2025-01-20 DIAGNOSIS — Z00.00 WELLNESS EXAMINATION: ICD-10-CM

## 2025-01-20 DIAGNOSIS — Z13.1 SCREENING FOR DIABETES MELLITUS: ICD-10-CM

## 2025-01-20 DIAGNOSIS — Z76.0 MEDICATION REFILL: Primary | ICD-10-CM

## 2025-01-20 DIAGNOSIS — Z13.220 SCREENING CHOLESTEROL LEVEL: ICD-10-CM

## 2025-01-20 RX ORDER — ESCITALOPRAM OXALATE 20 MG/1
20 TABLET ORAL DAILY
Qty: 90 TABLET | Refills: 1 | Status: SHIPPED | OUTPATIENT
Start: 2025-01-20

## 2025-01-20 NOTE — TELEPHONE ENCOUNTER
Spoke to PT, explained to her she would need a clean drug screen and quit taking the THC Gummys to be eligible for her anxiety med refill. Pt verbalized understanding and made a apt for labs on 2/7/2025

## 2025-01-29 DIAGNOSIS — Z76.0 REPEAT PRESCRIPTION ISSUE: Primary | ICD-10-CM

## 2025-01-29 DIAGNOSIS — Z76.0 REPEAT PRESCRIPTION ISSUE: ICD-10-CM

## 2025-01-29 DIAGNOSIS — F41.9 ANXIETY: ICD-10-CM

## 2025-01-29 RX ORDER — METAXALONE 800 MG/1
800 TABLET ORAL 3 TIMES DAILY PRN
Qty: 90 TABLET | Refills: 0 | Status: SHIPPED | OUTPATIENT
Start: 2025-01-29 | End: 2025-01-29 | Stop reason: SDUPTHER

## 2025-01-29 RX ORDER — METAXALONE 800 MG/1
800 TABLET ORAL 3 TIMES DAILY PRN
Qty: 90 TABLET | Refills: 0 | Status: SHIPPED | OUTPATIENT
Start: 2025-01-29

## 2025-02-03 DIAGNOSIS — Z76.0 REPEAT PRESCRIPTION ISSUE: Primary | ICD-10-CM

## 2025-02-03 RX ORDER — METOPROLOL SUCCINATE 50 MG/1
50 TABLET, EXTENDED RELEASE ORAL DAILY
Qty: 30 TABLET | Refills: 9 | Status: SHIPPED | OUTPATIENT
Start: 2025-02-03

## 2025-02-04 DIAGNOSIS — F41.9 ANXIETY: ICD-10-CM

## 2025-02-04 RX ORDER — ONDANSETRON 4 MG/1
4 TABLET, ORALLY DISINTEGRATING ORAL EVERY 8 HOURS PRN
Qty: 30 TABLET | Refills: 0 | Status: SHIPPED | OUTPATIENT
Start: 2025-02-04

## 2025-02-10 ENCOUNTER — OFFICE VISIT (OUTPATIENT)
Age: 55
End: 2025-02-10
Payer: COMMERCIAL

## 2025-02-10 VITALS
BODY MASS INDEX: 32.13 KG/M2 | HEART RATE: 90 BPM | TEMPERATURE: 97.4 F | DIASTOLIC BLOOD PRESSURE: 76 MMHG | RESPIRATION RATE: 18 BRPM | HEIGHT: 68 IN | OXYGEN SATURATION: 94 % | WEIGHT: 212 LBS | SYSTOLIC BLOOD PRESSURE: 108 MMHG

## 2025-02-10 DIAGNOSIS — F41.9 ANXIETY: Primary | ICD-10-CM

## 2025-02-10 DIAGNOSIS — M79.7 FIBROMYALGIA: ICD-10-CM

## 2025-02-10 DIAGNOSIS — T14.8XXA BRUISING: ICD-10-CM

## 2025-02-10 DIAGNOSIS — G89.4 CHRONIC PAIN SYNDROME: ICD-10-CM

## 2025-02-10 NOTE — PROGRESS NOTES
Chief Complaint  Anxiety    Subjective        Norma Thomas presents to [facility]  Anxiety        History of Present Illness  The patient is a 55-year-old female who presents for evaluation of chronic inflammation and pain.    She has been experiencing persistent inflammation and pain, necessitating a change in her medication regimen. She received intravenous hydration last Tuesday, along with vitamin D and B12 injections. A Toradol injection was administered due to the severity of her symptoms, providing relief for only two days. Her pain has been so intense that it has significantly impacted her daily activities and quality of life. She has tried various medications,  but found them ineffective or intolerable. She is sensitive to medications and prefers to avoid them, but acknowledges the need for some form of treatment. She discontinued diclofenac due to its side effects, including gastrointestinal issues and ulcers. She is seeking alternative treatment options for her chronic inflammation and associated pain, which includes nerve pain. She uses a vibration plate for relief but can only tolerate it for 10 minutes. She is considering lab tests to assess her kidney and liver function. She has not consulted a pain management specialist. She has been avoiding spinal injections due to fear, but is now considering them due to her deteriorating quality of life. She is concerned about potential wheelchair dependence. She finds massages helpful but can not afford them. She has been recommended for TMS therapy, but is not a candidate due to a dental implant. She has a history of fibromyalgia, diagnosed in her 40s, and has tested positive for DOMENICO. She also has scoliosis, degenerative disc disease, and a herniated shifted disc. She contracted COVID-19 two years ago and has been experiencing long COVID syndrome since then. She is scheduled to see Dr. Carrasquillo on Wednesday for ADHD testing. She has  "a history of breast biopsies, all of which were benign. She has a family history of breast cancer in both maternal and paternal aunts. 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 She also tested positive for Kenan-Barr virus. She contracted COVID-19 in January and May 2023.    She experienced significant bruising and bleeding following her injections last week, which is unusual for her. She has a history of blood clots and factor V Leiden, diagnosed by Dr. Myles.    SOCIAL HISTORY  She does not drink alcohol.    FAMILY HISTORY  Her mother and all her maternal family members had blood clots. Both of her aunts, her mother and father's sisters, had breast cancer.    MEDICATIONS  Discontinued: diclofenac, Cymbalta       Objective   Vital Signs:  /76 (BP Location: Right arm, Patient Position: Sitting, Cuff Size: Adult)   Pulse 90   Temp 97.4 °F (36.3 °C) (Oral)   Resp 18   Ht 172.7 cm (67.99\")   Wt 96.2 kg (212 lb)   SpO2 94%   BMI 32.24 kg/m²   Estimated body mass index is 32.24 kg/m² as calculated from the following:    Height as of this encounter: 172.7 cm (67.99\").    Weight as of this encounter: 96.2 kg (212 lb).            Review of Systems   Physical Exam  Constitutional:       General: She is not in acute distress.     Appearance: Normal appearance. She is normal weight. She is not toxic-appearing.   HENT:      Head: Normocephalic and atraumatic.      Right Ear: Tympanic membrane, ear canal and external ear normal. There is no impacted cerumen.      Left Ear: Tympanic membrane, ear canal and external ear normal. There is no impacted cerumen.      Nose: Nose normal. No congestion or rhinorrhea.      Mouth/Throat:      Mouth: Mucous membranes are moist.      Pharynx: Oropharynx is clear. No oropharyngeal exudate or posterior oropharyngeal erythema.   Eyes:      General: No scleral icterus.     Extraocular Movements: Extraocular movements intact.      Conjunctiva/sclera: Conjunctivae " normal.      Pupils: Pupils are equal, round, and reactive to light.   Neck:      Vascular: No carotid bruit.   Cardiovascular:      Rate and Rhythm: Normal rate and regular rhythm.      Pulses: Normal pulses.      Heart sounds: Normal heart sounds. No murmur heard.     No friction rub. No gallop.   Pulmonary:      Effort: Pulmonary effort is normal. No respiratory distress.      Breath sounds: Normal breath sounds. No stridor. No wheezing, rhonchi or rales.   Chest:      Chest wall: No tenderness.   Abdominal:      General: Abdomen is flat. There is no distension.      Palpations: Abdomen is soft. There is no mass.      Tenderness: There is no abdominal tenderness. There is no right CVA tenderness, left CVA tenderness, guarding or rebound.      Hernia: No hernia is present.   Musculoskeletal:         General: Normal range of motion.      Cervical back: No rigidity or tenderness.      Right lower leg: No edema.      Left lower leg: No edema.      Comments: Physical exam is consisting with  fibromyalgia point tenderness.    Lymphadenopathy:      Cervical: No cervical adenopathy.   Skin:     General: Skin is warm and dry.      Capillary Refill: Capillary refill takes less than 2 seconds.      Coloration: Skin is not jaundiced or pale.      Findings: No bruising, erythema or lesion.   Neurological:      General: No focal deficit present.      Mental Status: She is alert and oriented to person, place, and time. Mental status is at baseline.   Psychiatric:         Mood and Affect: Mood normal.         Behavior: Behavior normal.         Thought Content: Thought content normal.         Judgment: Judgment normal.        Result Review :          Results                Assessment and Plan   Diagnoses and all orders for this visit:    1. Medication refill (Primary)  -     Cancel: POC 12 Panel Urine Drug Screen    2. Chronic pain syndrome        Assessment & Plan  1. Chronic inflammation and pain.  She reports constant  inflammation and pain affecting her quality of life. She has tried various medications, including Cymbalta, but could not tolerate them. She received a Toradol shot last Tuesday, which only provided relief for two days. She is advised to avoid using the vibration plate as it may exacerbate neuropathy. A comprehensive set of labs, including C-reactive protein and sed rate, will be ordered to assess her inflammatory markers. Genetic testing for medication compatibility will be conducted. If the genetic testing supports it, Cymbalta may be reconsidered titrated as tolerated to  dose for fibromyalgia.     2. Bruising.  She reports significant bruising and bleeding after receiving shots last week. A PT and INR will be added to her lab orders to evaluate her coagulation status.  3.  Anxiety.  She would like a refill of her diazepam a urine drug screen will be drawn today if that is consistent we will resume her diazepam prescription.  PROCEDURE  The patient received a Toradol injection last Tuesday due to the severity of her symptoms, which provided relief for only two days. She also received vitamin D and B12 injections.           Follow Up   No follow-ups on file.  Patient was given instructions and counseling regarding her condition or for health maintenance advice. Please see specific information pulled into the AVS if appropriate.         Patient or patient representative verbalized consent for the use of Ambient Listening during the visit with  SHANIA Kelly for chart documentation. 2/15/2025  15:49 EST

## 2025-02-11 ENCOUNTER — TELEPHONE (OUTPATIENT)
Age: 55
End: 2025-02-11

## 2025-02-11 LAB
CRP SERPL-MCNC: 3 MG/L (ref 0–10)
ERYTHROCYTE [SEDIMENTATION RATE] IN BLOOD BY WESTERGREN METHOD: 19 MM/HR (ref 0–40)
INR PPP: 1 (ref 0.9–1.2)
PROTHROMBIN TIME: 10.9 SEC (ref 9.1–12)

## 2025-02-11 NOTE — TELEPHONE ENCOUNTER
Caller: Norma Thomas    Relationship to patient: Self    Best call back number:     Patient is needing: PATIENT WOULD LIKE A CALLBACK WITH HER LAB RESULTS ONCE THEY ARE BACK.

## 2025-02-14 LAB — DRUGS UR: NORMAL

## 2025-02-15 RX ORDER — DIAZEPAM 10 MG/1
10 TABLET ORAL 2 TIMES DAILY
Qty: 20 TABLET | Refills: 0 | Status: SHIPPED | OUTPATIENT
Start: 2025-02-15

## 2025-02-15 RX ORDER — DIAZEPAM 10 MG/1
1 TABLET ORAL 2 TIMES DAILY
COMMUNITY
End: 2025-02-15 | Stop reason: SDUPTHER

## 2025-02-17 ENCOUNTER — TELEPHONE (OUTPATIENT)
Age: 55
End: 2025-02-17

## 2025-02-26 ENCOUNTER — TELEPHONE (OUTPATIENT)
Age: 55
End: 2025-02-26

## 2025-02-26 NOTE — TELEPHONE ENCOUNTER
Caller: [facility]    Relationship to patient: Other    Best call back number: [phone]     Patient is needing: THEY WANTED TO LET DR. BRUNER KNOW THAT THE PATIENT HAS MISSED 3 NEW PATIENT APPOINTMENTS SO THEY WILL NOT BE RESCHEDULING THE PATIENT

## 2025-03-02 DIAGNOSIS — Z76.0 REPEAT PRESCRIPTION ISSUE: ICD-10-CM

## 2025-03-03 RX ORDER — METAXALONE 800 MG/1
800 TABLET ORAL 3 TIMES DAILY PRN
Qty: 90 TABLET | Refills: 0 | Status: SHIPPED | OUTPATIENT
Start: 2025-03-03

## 2025-03-11 DIAGNOSIS — F41.9 ANXIETY: ICD-10-CM

## 2025-03-11 RX ORDER — ONDANSETRON 4 MG/1
4 TABLET, ORALLY DISINTEGRATING ORAL EVERY 8 HOURS PRN
Qty: 30 TABLET | Refills: 0 | Status: SHIPPED | OUTPATIENT
Start: 2025-03-11

## 2025-03-11 NOTE — TELEPHONE ENCOUNTER
Caller: [facility]rma    Relationship: Self    Best call back number:     Requested Prescriptions:   Requested Prescriptions     Pending Prescriptions Disp Refills    ondansetron ODT (ZOFRAN-ODT) 4 MG disintegrating tablet 30 tablet 0     Sig: Place 1 tablet on the tongue Every 8 (Eight) Hours As Needed for Nausea or Vomiting.        Pharmacy where request should be sent: [facility] [id number] [place] [id number] [facility] [address] & [facility] [phone] [facility] [phone] FX     Last office visit with prescribing clinician: Visit date not found   Last telemedicine visit with prescribing clinician: Visit date not found   Next office visit with prescribing clinician: Visit date not found     Additional details provided by patient:     Does the patient have less than a 3 day supply:  [x] Yes  [] No    Stephen Hussein Rep   03/11/25 13:31 EDT

## 2025-04-03 ENCOUNTER — TELEPHONE (OUTPATIENT)
Age: 55
End: 2025-04-03
Payer: COMMERCIAL

## 2025-04-03 DIAGNOSIS — F41.9 ANXIETY: ICD-10-CM

## 2025-04-03 RX ORDER — DIAZEPAM 10 MG/1
10 TABLET ORAL 2 TIMES DAILY
Qty: 20 TABLET | Refills: 0 | OUTPATIENT
Start: 2025-04-03

## 2025-04-21 DIAGNOSIS — F41.9 ANXIETY: ICD-10-CM

## 2025-04-21 DIAGNOSIS — G47.09 OTHER INSOMNIA: ICD-10-CM

## 2025-04-21 RX ORDER — DIAZEPAM 10 MG/1
10 TABLET ORAL 2 TIMES DAILY
Qty: 20 TABLET | Refills: 0 | Status: SHIPPED | OUTPATIENT
Start: 2025-04-21

## 2025-04-21 RX ORDER — ZOLPIDEM TARTRATE 10 MG/1
10 TABLET ORAL
Qty: 30 TABLET | Refills: 3 | Status: SHIPPED | OUTPATIENT
Start: 2025-04-21

## 2025-04-21 NOTE — TELEPHONE ENCOUNTER
PATIENT CALLED FOR MEDICATION REFILL OF  diazePAM (VALIUM) 10 MG tablet   SHE HAS 1 LEFT      zolpidem (AMBIEN) 10 MG tablet   SHE HAS 1 AND HALF TABLETS LEFT    SHE WAS LAST SEEN ON 2/10/25    [facility] [id number] - [place] [id number] AT [facility] [address] - [phone]  - [phone]  [phone]     CALL BACK NUMBER [phone]

## 2025-04-22 DIAGNOSIS — G47.09 OTHER INSOMNIA: ICD-10-CM

## 2025-04-22 RX ORDER — ZOLPIDEM TARTRATE 10 MG/1
10 TABLET ORAL
Qty: 30 TABLET | OUTPATIENT
Start: 2025-04-22

## 2025-04-27 DIAGNOSIS — Z76.0 REPEAT PRESCRIPTION ISSUE: ICD-10-CM

## 2025-04-27 RX ORDER — METAXALONE 800 MG/1
800 TABLET ORAL 3 TIMES DAILY PRN
Qty: 90 TABLET | Refills: 0 | Status: SHIPPED | OUTPATIENT
Start: 2025-04-27

## 2025-05-16 ENCOUNTER — TELEPHONE (OUTPATIENT)
Age: 55
End: 2025-05-16
Payer: COMMERCIAL

## 2025-05-16 NOTE — TELEPHONE ENCOUNTER
Returned Pt call let her know Yohan put in a referral for Psych and that's who she needs to follow up with for genetic tested and her psych meds. Pt verbalized understanding.

## 2025-05-16 NOTE — TELEPHONE ENCOUNTER
Caller: Norma Thomas    Relationship: Self    Best call back number: [phone]         Who are you requesting to speak with (clinical staff, provider,  specific staff member): PCP OR CLINICAL    Do you know the name of the person who called: PATIENT IS ASKING WHEN SHE SHOULD COME BACK IN FOR MEDICATION CHECK AND ALSO SHE WANTS TO KNOW MORE ABOUT THE GENETIC TESTING SHE AND CHRIS TALKED ABOUT AT HER FEBRUARY VISIT.  PLEASE CALL TO ADVISE.

## 2025-05-26 DIAGNOSIS — F41.9 ANXIETY: ICD-10-CM

## 2025-05-27 RX ORDER — ONDANSETRON 4 MG/1
TABLET, ORALLY DISINTEGRATING ORAL
Qty: 18 TABLET | Refills: 3 | Status: SHIPPED | OUTPATIENT
Start: 2025-05-27

## 2025-05-29 DIAGNOSIS — Z76.0 REPEAT PRESCRIPTION ISSUE: ICD-10-CM

## 2025-05-29 RX ORDER — METAXALONE 800 MG/1
800 TABLET ORAL 3 TIMES DAILY PRN
Qty: 90 TABLET | Refills: 0 | Status: SHIPPED | OUTPATIENT
Start: 2025-05-29

## 2025-05-29 NOTE — TELEPHONE ENCOUNTER
Caller: Norma Thomas    Relationship: Self    Best call back number: [phone]     Requested Prescriptions:   Requested Prescriptions     Pending Prescriptions Disp Refills    metaxalone (SKELAXIN) 800 MG tablet 90 tablet 0     Sig: Take 1 tablet by mouth 3 (Three) Times a Day As Needed for Muscle Spasms.        Pharmacy where request should be sent: [facility] [id number] [place] [id number] AT [facility] [address] & [facility] [phone] [facility] [phone] FX     Last office visit with prescribing clinician: Visit date not found   Last telemedicine visit with prescribing clinician: Visit date not found   Next office visit with prescribing clinician: Visit date not found       Would you like a call back once the refill request has been completed: [x] Yes [] No    If the office needs to give you a call back, can they leave a voicemail: [x] Yes [] No    Stephen Cohen Rep   05/29/25 11:19 EDT

## 2025-06-25 DIAGNOSIS — Z76.0 REPEAT PRESCRIPTION ISSUE: ICD-10-CM

## 2025-06-25 RX ORDER — METAXALONE 800 MG/1
800 TABLET ORAL 3 TIMES DAILY PRN
Qty: 90 TABLET | Refills: 0 | Status: SHIPPED | OUTPATIENT
Start: 2025-06-25

## 2025-07-01 RX ORDER — ALBUTEROL SULFATE 1.25 MG/3ML
1 SOLUTION RESPIRATORY (INHALATION) EVERY 6 HOURS PRN
Qty: 120 EACH | Refills: 3 | Status: SHIPPED | OUTPATIENT
Start: 2025-07-01

## 2025-07-22 DIAGNOSIS — Z76.0 REPEAT PRESCRIPTION ISSUE: ICD-10-CM

## 2025-07-22 RX ORDER — METAXALONE 800 MG/1
800 TABLET ORAL 3 TIMES DAILY PRN
Qty: 90 TABLET | Refills: 0 | OUTPATIENT
Start: 2025-07-22

## 2025-07-29 DIAGNOSIS — Z76.0 REPEAT PRESCRIPTION ISSUE: ICD-10-CM

## 2025-07-29 RX ORDER — ESCITALOPRAM OXALATE 20 MG/1
20 TABLET ORAL DAILY
Qty: 90 TABLET | Refills: 1 | Status: SHIPPED | OUTPATIENT
Start: 2025-07-29

## 2025-07-29 RX ORDER — METAXALONE 800 MG/1
800 TABLET ORAL 3 TIMES DAILY PRN
Qty: 90 TABLET | Refills: 0 | Status: SHIPPED | OUTPATIENT
Start: 2025-07-29

## 2025-07-29 NOTE — TELEPHONE ENCOUNTER
Caller: Norma Thomas    Relationship: Self    Best call back number: [phone]     Requested Prescriptions:   Requested Prescriptions     Pending Prescriptions Disp Refills    metaxalone (SKELAXIN) 800 MG tablet 90 tablet 0     Sig: Take 1 tablet by mouth 3 (Three) Times a Day As Needed for Muscle Spasms.        Pharmacy where request should be sent: [facility] [id number] [place] [id number] AT [facility] [address] & [facility] [phone] [facility] [phone] FX     Last office visit with prescribing clinician: Visit date not found   Last telemedicine visit with prescribing clinician: Visit date not found   Next office visit with prescribing clinician: 7/31/25    Additional details provided by patient: PATIENT IS OUT OF MEDICATION AND HAS AN APPOINTMENT ON 7/31/25.    Does the patient have less than a 3 day supply:  [x] Yes  [] No    Would you like a call back once the refill request has been completed: [] Yes [] No    If the office needs to give you a call back, can they leave a voicemail: [] Yes [] No    Stephen Edwards Rep   07/29/25 11:30 EDT

## 2025-08-04 ENCOUNTER — OFFICE VISIT (OUTPATIENT)
Age: 55
End: 2025-08-04
Payer: COMMERCIAL

## 2025-08-04 VITALS
HEART RATE: 77 BPM | RESPIRATION RATE: 14 BRPM | DIASTOLIC BLOOD PRESSURE: 80 MMHG | WEIGHT: 213.5 LBS | HEIGHT: 68 IN | OXYGEN SATURATION: 96 % | BODY MASS INDEX: 32.36 KG/M2 | TEMPERATURE: 97.5 F | SYSTOLIC BLOOD PRESSURE: 120 MMHG

## 2025-08-04 DIAGNOSIS — E04.1 THYROID NODULE: ICD-10-CM

## 2025-08-04 DIAGNOSIS — M79.7 FIBROMYALGIA: ICD-10-CM

## 2025-08-04 DIAGNOSIS — R29.818 SUSPECTED SLEEP APNEA: Primary | ICD-10-CM

## 2025-08-04 DIAGNOSIS — R53.82 CHRONIC FATIGUE: ICD-10-CM

## 2025-08-04 DIAGNOSIS — F41.9 ANXIETY: ICD-10-CM

## 2025-08-04 DIAGNOSIS — T14.8XXA BRUISING: ICD-10-CM

## 2025-08-04 RX ORDER — BUPROPION HYDROCHLORIDE 150 MG/1
150 TABLET ORAL DAILY
Qty: 15 TABLET | Refills: 0 | Status: SHIPPED | OUTPATIENT
Start: 2025-08-04

## 2025-08-05 LAB
25(OH)D3+25(OH)D2 SERPL-MCNC: 32.6 NG/ML (ref 30–100)
ALBUMIN SERPL-MCNC: 4.4 G/DL (ref 3.8–4.9)
ALP SERPL-CCNC: 71 IU/L (ref 44–121)
ALT SERPL-CCNC: 13 IU/L (ref 0–32)
AST SERPL-CCNC: 20 IU/L (ref 0–40)
BASOPHILS # BLD AUTO: 0 X10E3/UL (ref 0–0.2)
BASOPHILS NFR BLD AUTO: 1 %
BILIRUB DIRECT SERPL-MCNC: 0.15 MG/DL (ref 0–0.4)
BILIRUB SERPL-MCNC: 0.4 MG/DL (ref 0–1.2)
BUN SERPL-MCNC: 15 MG/DL (ref 6–24)
BUN/CREAT SERPL: 15 (ref 9–23)
CALCIUM SERPL-MCNC: 9.6 MG/DL (ref 8.7–10.2)
CHLORIDE SERPL-SCNC: 103 MMOL/L (ref 96–106)
CO2 SERPL-SCNC: 22 MMOL/L (ref 20–29)
CREAT SERPL-MCNC: 0.98 MG/DL (ref 0.57–1)
EGFRCR SERPLBLD CKD-EPI 2021: 67 ML/MIN/1.73
EOSINOPHIL # BLD AUTO: 0 X10E3/UL (ref 0–0.4)
EOSINOPHIL NFR BLD AUTO: 1 %
ERYTHROCYTE [DISTWIDTH] IN BLOOD BY AUTOMATED COUNT: 12.1 % (ref 11.7–15.4)
GLUCOSE SERPL-MCNC: 111 MG/DL (ref 70–99)
HBA1C MFR BLD: 5.6 % (ref 4.8–5.6)
HCT VFR BLD AUTO: 46.4 % (ref 34–46.6)
HGB BLD-MCNC: 15 G/DL (ref 11.1–15.9)
IMM GRANULOCYTES # BLD AUTO: 0 X10E3/UL (ref 0–0.1)
IMM GRANULOCYTES NFR BLD AUTO: 0 %
LYMPHOCYTES # BLD AUTO: 1 X10E3/UL (ref 0.7–3.1)
LYMPHOCYTES NFR BLD AUTO: 17 %
MCH RBC QN AUTO: 30.5 PG (ref 26.6–33)
MCHC RBC AUTO-ENTMCNC: 32.3 G/DL (ref 31.5–35.7)
MCV RBC AUTO: 95 FL (ref 79–97)
MONOCYTES # BLD AUTO: 0.5 X10E3/UL (ref 0.1–0.9)
MONOCYTES NFR BLD AUTO: 9 %
NEUTROPHILS # BLD AUTO: 4.3 X10E3/UL (ref 1.4–7)
NEUTROPHILS NFR BLD AUTO: 72 %
PLATELET # BLD AUTO: 310 X10E3/UL (ref 150–450)
POTASSIUM SERPL-SCNC: 4.1 MMOL/L (ref 3.5–5.2)
RBC # BLD AUTO: 4.91 X10E6/UL (ref 3.77–5.28)
SODIUM SERPL-SCNC: 143 MMOL/L (ref 134–144)
TSH SERPL DL<=0.005 MIU/L-ACNC: 1.17 UIU/ML (ref 0.45–4.5)
WBC # BLD AUTO: 5.9 X10E3/UL (ref 3.4–10.8)

## 2025-08-12 ENCOUNTER — HOSPITAL ENCOUNTER (EMERGENCY)
Facility: HOSPITAL | Age: 55
Discharge: HOME | End: 2025-08-12
Attending: EMERGENCY MEDICINE | Admitting: EMERGENCY MEDICINE
Payer: COMMERCIAL

## 2025-08-12 VITALS
TEMPERATURE: 98.2 F | HEIGHT: 68 IN | WEIGHT: 210 LBS | HEART RATE: 67 BPM | BODY MASS INDEX: 31.83 KG/M2 | DIASTOLIC BLOOD PRESSURE: 55 MMHG | RESPIRATION RATE: 16 BRPM | SYSTOLIC BLOOD PRESSURE: 104 MMHG | OXYGEN SATURATION: 96 %

## 2025-08-12 DIAGNOSIS — N15.9 KIDNEY INFECTION: Primary | ICD-10-CM

## 2025-08-12 DIAGNOSIS — R35.0 FREQUENCY OF URINATION: ICD-10-CM

## 2025-08-12 DIAGNOSIS — R30.0 DYSURIA: ICD-10-CM

## 2025-08-12 LAB
ALBUMIN SERPL-MCNC: 4.2 G/DL (ref 3.5–5.2)
ALBUMIN/GLOB SERPL: 1.8 G/DL
ALP SERPL-CCNC: 71 U/L (ref 39–117)
ALT SERPL W P-5'-P-CCNC: 11 U/L (ref 1–33)
ANION GAP SERPL CALCULATED.3IONS-SCNC: 9.4 MMOL/L (ref 5–15)
AST SERPL-CCNC: 13 U/L (ref 1–32)
BACTERIA UR QL AUTO: ABNORMAL /HPF
BASOPHILS # BLD AUTO: 0.03 10*3/MM3 (ref 0–0.2)
BASOPHILS NFR BLD AUTO: 0.4 % (ref 0–1.5)
BILIRUB SERPL-MCNC: 0.3 MG/DL (ref 0–1.2)
BILIRUB UR QL STRIP: ABNORMAL
BUN SERPL-MCNC: 22.9 MG/DL (ref 6–20)
BUN/CREAT SERPL: 24.4 (ref 7–25)
CALCIUM SPEC-SCNC: 10.3 MG/DL (ref 8.6–10.5)
CHLORIDE SERPL-SCNC: 105 MMOL/L (ref 98–107)
CLARITY UR: ABNORMAL
CO2 SERPL-SCNC: 26.6 MMOL/L (ref 22–29)
COD CRY URNS QL: PRESENT /HPF
COLOR UR: ABNORMAL
CREAT SERPL-MCNC: 0.94 MG/DL (ref 0.57–1)
D-LACTATE SERPL-SCNC: 0.6 MMOL/L (ref 0.5–2)
DEPRECATED RDW RBC AUTO: 43.6 FL (ref 37–54)
EGFRCR SERPLBLD CKD-EPI 2021: 70.9 ML/MIN/1.73
EOSINOPHIL # BLD AUTO: 0.06 10*3/MM3 (ref 0–0.4)
EOSINOPHIL NFR BLD AUTO: 0.8 % (ref 0.3–6.2)
ERYTHROCYTE [DISTWIDTH] IN BLOOD BY AUTOMATED COUNT: 12 % (ref 12.3–15.4)
GLOBULIN UR ELPH-MCNC: 2.4 GM/DL
GLUCOSE SERPL-MCNC: 111 MG/DL (ref 65–99)
GLUCOSE UR STRIP-MCNC: ABNORMAL MG/DL
HCT VFR BLD AUTO: 43.5 % (ref 34–46.6)
HGB BLD-MCNC: 13.6 G/DL (ref 12–15.9)
HGB UR QL STRIP.AUTO: NEGATIVE
HOLD SPECIMEN: NORMAL
IMM GRANULOCYTES # BLD AUTO: 0.02 10*3/MM3 (ref 0–0.05)
IMM GRANULOCYTES NFR BLD AUTO: 0.3 % (ref 0–0.5)
KETONES UR QL STRIP: ABNORMAL
LEUKOCYTE ESTERASE UR QL STRIP.AUTO: NEGATIVE
LIPASE SERPL-CCNC: 37 U/L (ref 13–60)
LYMPHOCYTES # BLD AUTO: 1.26 10*3/MM3 (ref 0.7–3.1)
LYMPHOCYTES NFR BLD AUTO: 15.8 % (ref 19.6–45.3)
MCH RBC QN AUTO: 30.6 PG (ref 26.6–33)
MCHC RBC AUTO-ENTMCNC: 31.3 G/DL (ref 31.5–35.7)
MCV RBC AUTO: 97.8 FL (ref 79–97)
MONOCYTES # BLD AUTO: 0.91 10*3/MM3 (ref 0.1–0.9)
MONOCYTES NFR BLD AUTO: 11.4 % (ref 5–12)
NEUTROPHILS NFR BLD AUTO: 5.71 10*3/MM3 (ref 1.7–7)
NEUTROPHILS NFR BLD AUTO: 71.3 % (ref 42.7–76)
NITRITE UR QL STRIP: POSITIVE
PH UR STRIP.AUTO: 5.5 [PH] (ref 5–8)
PLATELET # BLD AUTO: 259 10*3/MM3 (ref 140–450)
PMV BLD AUTO: 9.3 FL (ref 6–12)
POTASSIUM SERPL-SCNC: 4 MMOL/L (ref 3.5–5.2)
PROT SERPL-MCNC: 6.6 G/DL (ref 6–8.5)
PROT UR QL STRIP: ABNORMAL
RBC # BLD AUTO: 4.45 10*6/MM3 (ref 3.77–5.28)
RBC # UR STRIP: ABNORMAL /HPF
REF LAB TEST METHOD: ABNORMAL
SODIUM SERPL-SCNC: 141 MMOL/L (ref 136–145)
SP GR UR STRIP: >=1.03 (ref 1–1.03)
SQUAMOUS #/AREA URNS HPF: ABNORMAL /HPF
UROBILINOGEN UR QL STRIP: ABNORMAL
WBC # UR STRIP: ABNORMAL /HPF
WBC NRBC COR # BLD AUTO: 7.99 10*3/MM3 (ref 3.4–10.8)

## 2025-08-12 PROCEDURE — 83605 ASSAY OF LACTIC ACID: CPT | Performed by: NURSE PRACTITIONER

## 2025-08-12 PROCEDURE — 80053 COMPREHEN METABOLIC PANEL: CPT | Performed by: NURSE PRACTITIONER

## 2025-08-12 PROCEDURE — 96361 HYDRATE IV INFUSION ADD-ON: CPT

## 2025-08-12 PROCEDURE — 83690 ASSAY OF LIPASE: CPT | Performed by: NURSE PRACTITIONER

## 2025-08-12 PROCEDURE — 99284 EMERGENCY DEPT VISIT MOD MDM: CPT | Performed by: NURSE PRACTITIONER

## 2025-08-12 PROCEDURE — 96375 TX/PRO/DX INJ NEW DRUG ADDON: CPT

## 2025-08-12 PROCEDURE — 81001 URINALYSIS AUTO W/SCOPE: CPT | Performed by: EMERGENCY MEDICINE

## 2025-08-12 PROCEDURE — 25010000002 KETOROLAC TROMETHAMINE PER 15 MG: Performed by: NURSE PRACTITIONER

## 2025-08-12 PROCEDURE — 85025 COMPLETE CBC W/AUTO DIFF WBC: CPT | Performed by: NURSE PRACTITIONER

## 2025-08-12 PROCEDURE — 25010000002 ONDANSETRON PER 1 MG: Performed by: NURSE PRACTITIONER

## 2025-08-12 PROCEDURE — 96374 THER/PROPH/DIAG INJ IV PUSH: CPT

## 2025-08-12 PROCEDURE — 25810000003 SODIUM CHLORIDE 0.9 % SOLUTION: Performed by: NURSE PRACTITIONER

## 2025-08-12 PROCEDURE — 99283 EMERGENCY DEPT VISIT LOW MDM: CPT | Performed by: EMERGENCY MEDICINE

## 2025-08-12 RX ORDER — SODIUM CHLORIDE 0.9 % (FLUSH) 0.9 %
10 SYRINGE (ML) INJECTION AS NEEDED
Status: DISCONTINUED | OUTPATIENT
Start: 2025-08-12 | End: 2025-08-13 | Stop reason: HOSPADM

## 2025-08-12 RX ORDER — KETOROLAC TROMETHAMINE 15 MG/ML
15 INJECTION, SOLUTION INTRAMUSCULAR; INTRAVENOUS ONCE
Status: COMPLETED | OUTPATIENT
Start: 2025-08-12 | End: 2025-08-12

## 2025-08-12 RX ORDER — ONDANSETRON 2 MG/ML
4 INJECTION INTRAMUSCULAR; INTRAVENOUS ONCE
Status: COMPLETED | OUTPATIENT
Start: 2025-08-12 | End: 2025-08-12

## 2025-08-12 RX ORDER — NITROFURANTOIN 25; 75 MG/1; MG/1
100 CAPSULE ORAL EVERY 12 HOURS SCHEDULED
Status: COMPLETED | OUTPATIENT
Start: 2025-08-12 | End: 2025-08-12

## 2025-08-12 RX ORDER — NITROFURANTOIN 25; 75 MG/1; MG/1
100 CAPSULE ORAL 2 TIMES DAILY
Qty: 14 CAPSULE | Refills: 0 | Status: SHIPPED | OUTPATIENT
Start: 2025-08-12 | End: 2025-08-19

## 2025-08-12 RX ORDER — ONDANSETRON 4 MG/1
4 TABLET, FILM COATED ORAL EVERY 8 HOURS PRN
Qty: 12 TABLET | Refills: 0 | Status: SHIPPED | OUTPATIENT
Start: 2025-08-12 | End: 2025-08-16

## 2025-08-12 RX ORDER — DIPHENHYDRAMINE HYDROCHLORIDE 50 MG/ML
25 INJECTION, SOLUTION INTRAMUSCULAR; INTRAVENOUS ONCE
Status: DISCONTINUED | OUTPATIENT
Start: 2025-08-12 | End: 2025-08-13 | Stop reason: HOSPADM

## 2025-08-12 RX ADMIN — KETOROLAC TROMETHAMINE 15 MG: 15 INJECTION INTRAMUSCULAR at 21:33

## 2025-08-12 RX ADMIN — ONDANSETRON 4 MG: 2 INJECTION, SOLUTION INTRAMUSCULAR; INTRAVENOUS at 21:33

## 2025-08-12 RX ADMIN — SODIUM CHLORIDE 1000 ML: 9 INJECTION, SOLUTION INTRAVENOUS at 21:27

## 2025-08-12 RX ADMIN — NITROFURANTOIN MONOHYDRATE/MACROCRYSTALLINE 100 MG: 25; 75 CAPSULE ORAL at 22:55

## 2025-08-14 RX ORDER — BUPROPION HYDROCHLORIDE 150 MG/1
150 TABLET ORAL DAILY
Qty: 15 TABLET | Refills: 0 | OUTPATIENT
Start: 2025-08-14

## 2025-08-19 ENCOUNTER — HOSPITAL ENCOUNTER (OUTPATIENT)
Facility: HOSPITAL | Age: 55
Discharge: HOME | End: 2025-08-19
Payer: COMMERCIAL

## 2025-08-21 ENCOUNTER — HOSPITAL ENCOUNTER (OUTPATIENT)
Facility: HOSPITAL | Age: 55
Discharge: HOME | End: 2025-08-21
Payer: COMMERCIAL

## 2025-08-21 PROCEDURE — 76536 US EXAM OF HEAD AND NECK: CPT

## 2025-08-21 RX ORDER — BUPROPION HYDROCHLORIDE 150 MG/1
150 TABLET ORAL DAILY
Qty: 15 TABLET | Refills: 0 | OUTPATIENT
Start: 2025-08-21

## 2025-08-26 ENCOUNTER — OFFICE VISIT (OUTPATIENT)
Age: 55
End: 2025-08-26
Payer: COMMERCIAL

## 2025-08-26 VITALS
OXYGEN SATURATION: 97 % | SYSTOLIC BLOOD PRESSURE: 112 MMHG | BODY MASS INDEX: 32.13 KG/M2 | DIASTOLIC BLOOD PRESSURE: 76 MMHG | HEART RATE: 74 BPM | WEIGHT: 212 LBS | RESPIRATION RATE: 14 BRPM | TEMPERATURE: 98.1 F | HEIGHT: 68 IN

## 2025-08-26 DIAGNOSIS — F41.9 ANXIETY: ICD-10-CM

## 2025-08-26 DIAGNOSIS — E04.1 THYROID NODULE: ICD-10-CM

## 2025-08-26 DIAGNOSIS — Z76.0 REPEAT PRESCRIPTION ISSUE: ICD-10-CM

## 2025-08-26 DIAGNOSIS — G47.09 OTHER INSOMNIA: ICD-10-CM

## 2025-08-26 DIAGNOSIS — R53.83 FATIGUE, UNSPECIFIED TYPE: ICD-10-CM

## 2025-08-26 DIAGNOSIS — F33.9 EPISODE OF RECURRENT MAJOR DEPRESSIVE DISORDER, UNSPECIFIED DEPRESSION EPISODE SEVERITY: Primary | ICD-10-CM

## 2025-08-26 PROCEDURE — 99214 OFFICE O/P EST MOD 30 MIN: CPT

## 2025-08-26 RX ORDER — METAXALONE 800 MG/1
800 TABLET ORAL 3 TIMES DAILY PRN
Qty: 90 TABLET | Refills: 0 | Status: SHIPPED | OUTPATIENT
Start: 2025-08-26

## 2025-08-26 RX ORDER — BUPROPION HYDROCHLORIDE 150 MG/1
150 TABLET ORAL DAILY
Qty: 30 TABLET | Refills: 0 | Status: SHIPPED | OUTPATIENT
Start: 2025-08-26

## 2025-08-26 RX ORDER — ZOLPIDEM TARTRATE 10 MG/1
10 TABLET ORAL
Qty: 30 TABLET | Refills: 3 | Status: SHIPPED | OUTPATIENT
Start: 2025-08-26

## 2025-08-26 RX ORDER — DIAZEPAM 10 MG/1
10 TABLET ORAL 2 TIMES DAILY
Qty: 20 TABLET | Refills: 0 | Status: SHIPPED | OUTPATIENT
Start: 2025-08-26

## 2025-08-26 RX ORDER — METAXALONE 800 MG/1
800 TABLET ORAL 3 TIMES DAILY PRN
Qty: 90 TABLET | Refills: 0 | Status: SHIPPED | OUTPATIENT
Start: 2025-08-26 | End: 2025-08-26 | Stop reason: SDUPTHER

## 2025-08-28 PROBLEM — E04.1 THYROID NODULE: Status: ACTIVE | Noted: 2025-08-28

## 2025-08-28 RX ORDER — LIDOCAINE 50 MG/G
1 PATCH TOPICAL SEE ADMIN INSTRUCTIONS
Qty: 30 PATCH | Refills: 0 | Status: SHIPPED | OUTPATIENT
Start: 2025-08-28